# Patient Record
Sex: MALE | Race: WHITE | Employment: OTHER | URBAN - METROPOLITAN AREA
[De-identification: names, ages, dates, MRNs, and addresses within clinical notes are randomized per-mention and may not be internally consistent; named-entity substitution may affect disease eponyms.]

---

## 2018-02-06 PROBLEM — Z12.5 SCREENING PSA (PROSTATE SPECIFIC ANTIGEN): Status: ACTIVE | Noted: 2018-02-06

## 2018-02-06 PROBLEM — E66.3 OVERWEIGHT (BMI 25.0-29.9): Status: ACTIVE | Noted: 2018-02-06

## 2018-02-06 PROBLEM — I10 BENIGN ESSENTIAL HTN: Status: ACTIVE | Noted: 2018-02-06

## 2018-05-15 PROBLEM — E78.00 HYPERCHOLESTEROLEMIA: Status: ACTIVE | Noted: 2018-05-15

## 2019-02-05 PROBLEM — Z91.14 NONCOMPLIANCE WITH MEDICATION REGIMEN: Status: ACTIVE | Noted: 2019-02-05

## 2019-02-05 PROBLEM — Z91.89 CANDIDATE FOR STATIN THERAPY DUE TO RISK OF FUTURE CARDIOVASCULAR EVENT: Status: ACTIVE | Noted: 2019-02-05

## 2019-04-16 PROBLEM — E66.3 OVERWEIGHT (BMI 25.0-29.9): Status: RESOLVED | Noted: 2018-02-06 | Resolved: 2019-04-16

## 2019-04-16 PROBLEM — Z91.14 NONCOMPLIANCE WITH MEDICATION REGIMEN: Status: RESOLVED | Noted: 2019-02-05 | Resolved: 2019-04-16

## 2019-04-21 PROBLEM — G89.29 CHRONIC LEFT SHOULDER PAIN: Status: ACTIVE | Noted: 2019-04-21

## 2019-04-21 PROBLEM — M25.512 CHRONIC LEFT SHOULDER PAIN: Status: ACTIVE | Noted: 2019-04-21

## 2019-05-09 ENCOUNTER — HOSPITAL ENCOUNTER (OUTPATIENT)
Dept: CT IMAGING | Age: 59
Discharge: HOME | End: 2019-05-09
Attending: FAMILY MEDICINE

## 2019-05-09 DIAGNOSIS — I10 BENIGN ESSENTIAL HTN: ICD-10-CM

## 2019-05-09 DIAGNOSIS — E78.00 HYPERCHOLESTEROLEMIA: ICD-10-CM

## 2019-05-09 DIAGNOSIS — Z91.89 CANDIDATE FOR STATIN THERAPY DUE TO RISK OF FUTURE CARDIOVASCULAR EVENT: ICD-10-CM

## 2019-05-10 PROBLEM — R93.1 ELEVATED CORONARY ARTERY CALCIUM SCORE: Status: ACTIVE | Noted: 2019-05-10

## 2019-05-10 PROBLEM — I25.84 CORONARY ARTERY DISEASE DUE TO CALCIFIED CORONARY LESION: Status: ACTIVE | Noted: 2019-05-10

## 2019-05-10 PROBLEM — I25.10 CORONARY ARTERY DISEASE DUE TO CALCIFIED CORONARY LESION: Status: ACTIVE | Noted: 2019-05-10

## 2019-05-19 PROBLEM — Z12.5 SCREENING PSA (PROSTATE SPECIFIC ANTIGEN): Status: ACTIVE | Noted: 2019-05-19

## 2019-05-20 PROBLEM — I77.810 ASCENDING AORTA DILATION (HCC): Status: ACTIVE | Noted: 2019-05-20

## 2019-09-13 PROBLEM — I25.10 CORONARY ARTERY DISEASE DUE TO CALCIFIED CORONARY LESION: Status: ACTIVE | Noted: 2019-09-13

## 2019-09-13 PROBLEM — I25.84 CORONARY ARTERY DISEASE DUE TO CALCIFIED CORONARY LESION: Status: ACTIVE | Noted: 2019-09-13

## 2019-09-19 PROBLEM — Z12.5 SCREENING PSA (PROSTATE SPECIFIC ANTIGEN): Status: RESOLVED | Noted: 2019-05-19 | Resolved: 2019-09-19

## 2019-09-27 ENCOUNTER — HOSPITAL ENCOUNTER (OUTPATIENT)
Dept: ULTRASOUND IMAGING | Age: 59
Discharge: HOME | End: 2019-09-27
Attending: FAMILY MEDICINE
Payer: COMMERCIAL

## 2019-09-27 DIAGNOSIS — I77.810 ASCENDING AORTA DILATION (HCC): ICD-10-CM

## 2019-09-27 PROCEDURE — 93978 VASCULAR STUDY: CPT

## 2020-12-28 PROBLEM — Z79.899 HIGH RISK MEDICATION USE: Chronic | Status: ACTIVE | Noted: 2020-12-28

## 2020-12-28 PROBLEM — Z00.00 WELL ADULT EXAM: Chronic | Status: ACTIVE | Noted: 2020-12-28

## 2020-12-28 PROBLEM — Z12.5 SCREENING FOR PROSTATE CANCER: Chronic | Status: ACTIVE | Noted: 2020-12-28

## 2020-12-28 PROBLEM — Z13.1 SCREENING FOR DIABETES MELLITUS: Chronic | Status: ACTIVE | Noted: 2020-12-28

## 2020-12-28 PROBLEM — Z00.00 WELL ADULT EXAM: Status: ACTIVE | Noted: 2020-12-28

## 2020-12-28 PROBLEM — Z79.899 HIGH RISK MEDICATION USE: Status: ACTIVE | Noted: 2020-12-28

## 2020-12-28 PROBLEM — Z13.1 SCREENING FOR DIABETES MELLITUS: Status: ACTIVE | Noted: 2020-12-28

## 2020-12-28 PROBLEM — Z79.899 HIGH RISK MEDICATION USE: Status: RESOLVED | Noted: 2020-12-28 | Resolved: 2020-12-28

## 2020-12-28 PROBLEM — Z12.5 SCREENING FOR PROSTATE CANCER: Status: ACTIVE | Noted: 2020-12-28

## 2020-12-28 PROBLEM — Z28.21 REFUSED INFLUENZA VACCINE: Status: ACTIVE | Noted: 2020-12-28

## 2020-12-29 PROBLEM — G89.29 CHRONIC PAIN OF LEFT KNEE: Status: ACTIVE | Noted: 2020-12-29

## 2020-12-29 PROBLEM — M25.562 CHRONIC PAIN OF LEFT KNEE: Status: ACTIVE | Noted: 2020-12-29

## 2022-02-14 PROBLEM — Z28.21 COVID-19 VACCINATION REFUSED: Status: ACTIVE | Noted: 2022-02-14

## 2022-02-14 PROBLEM — Z28.21 REFUSED VARICELLA VACCINE: Status: ACTIVE | Noted: 2022-02-14

## 2022-03-18 PROBLEM — M25.512 CHRONIC LEFT SHOULDER PAIN: Status: ACTIVE | Noted: 2019-04-21

## 2022-03-18 PROBLEM — Z28.21 COVID-19 VACCINATION REFUSED: Status: ACTIVE | Noted: 2022-02-14

## 2022-03-18 PROBLEM — Z28.21 REFUSED VARICELLA VACCINE: Status: ACTIVE | Noted: 2022-02-14

## 2022-03-18 PROBLEM — G89.29 CHRONIC LEFT SHOULDER PAIN: Status: ACTIVE | Noted: 2019-04-21

## 2022-03-18 PROBLEM — Z28.21 REFUSED INFLUENZA VACCINE: Status: ACTIVE | Noted: 2020-12-28

## 2022-03-18 PROBLEM — I77.810 ASCENDING AORTA DILATION (HCC): Status: ACTIVE | Noted: 2019-05-20

## 2022-03-19 PROBLEM — Z00.00 WELL ADULT EXAM: Status: ACTIVE | Noted: 2020-12-28

## 2022-03-19 PROBLEM — E78.00 HYPERCHOLESTEROLEMIA: Status: ACTIVE | Noted: 2018-05-15

## 2022-03-19 PROBLEM — I10 BENIGN ESSENTIAL HTN: Status: ACTIVE | Noted: 2018-02-06

## 2022-03-19 PROBLEM — Z12.5 SCREENING FOR PROSTATE CANCER: Status: ACTIVE | Noted: 2020-12-28

## 2022-03-19 PROBLEM — Z79.899 HIGH RISK MEDICATION USE: Status: ACTIVE | Noted: 2020-12-28

## 2022-03-19 PROBLEM — R93.1 ELEVATED CORONARY ARTERY CALCIUM SCORE: Status: ACTIVE | Noted: 2019-05-10

## 2022-03-19 PROBLEM — Z13.1 SCREENING FOR DIABETES MELLITUS: Status: ACTIVE | Noted: 2020-12-28

## 2022-03-20 PROBLEM — I25.10 CORONARY ARTERY DISEASE DUE TO CALCIFIED CORONARY LESION: Status: ACTIVE | Noted: 2019-09-13

## 2022-03-20 PROBLEM — G89.29 CHRONIC PAIN OF LEFT KNEE: Status: ACTIVE | Noted: 2020-12-29

## 2022-03-20 PROBLEM — I25.84 CORONARY ARTERY DISEASE DUE TO CALCIFIED CORONARY LESION: Status: ACTIVE | Noted: 2019-09-13

## 2022-03-20 PROBLEM — M25.562 CHRONIC PAIN OF LEFT KNEE: Status: ACTIVE | Noted: 2020-12-29

## 2023-02-05 PROBLEM — Z12.5 SCREENING FOR PROSTATE CANCER: Chronic | Status: ACTIVE | Noted: 2020-12-28

## 2023-02-05 PROBLEM — I77.810 ASCENDING AORTA DILATION (HCC): Chronic | Status: ACTIVE | Noted: 2019-05-20

## 2023-02-05 PROBLEM — R93.1 ELEVATED CORONARY ARTERY CALCIUM SCORE: Status: ACTIVE | Noted: 2019-05-10

## 2023-02-05 PROBLEM — E78.00 HYPERCHOLESTEROLEMIA: Chronic | Status: ACTIVE | Noted: 2018-05-15

## 2023-02-05 PROBLEM — N17.9 AKI (ACUTE KIDNEY INJURY) (HCC): Status: ACTIVE | Noted: 2023-02-01

## 2023-02-05 PROBLEM — I25.10 CORONARY ARTERY DISEASE DUE TO CALCIFIED CORONARY LESION: Chronic | Status: ACTIVE | Noted: 2019-09-13

## 2023-02-05 PROBLEM — Z79.899 HIGH RISK MEDICATION USE: Chronic | Status: ACTIVE | Noted: 2020-12-28

## 2023-02-05 PROBLEM — R73.02 IGT (IMPAIRED GLUCOSE TOLERANCE): Chronic | Status: ACTIVE | Noted: 2023-02-05

## 2023-02-05 PROBLEM — I10 BENIGN ESSENTIAL HTN: Chronic | Status: ACTIVE | Noted: 2018-02-06

## 2023-02-05 PROBLEM — N17.9 AKI (ACUTE KIDNEY INJURY) (HCC): Status: RESOLVED | Noted: 2023-02-01 | Resolved: 2023-02-05

## 2023-02-05 PROBLEM — R55 SYNCOPE: Status: ACTIVE | Noted: 2023-02-01

## 2023-02-05 PROBLEM — S27.0XXA TRAUMATIC PNEUMOTHORAX: Status: ACTIVE | Noted: 2023-02-01

## 2023-02-05 PROBLEM — S22.31XA CLOSED FRACTURE OF ONE RIB OF RIGHT SIDE: Status: ACTIVE | Noted: 2023-02-01

## 2023-02-05 PROBLEM — R93.1 ELEVATED CORONARY ARTERY CALCIUM SCORE: Chronic | Status: ACTIVE | Noted: 2019-05-10

## 2023-02-05 PROBLEM — I25.84 CORONARY ARTERY DISEASE DUE TO CALCIFIED CORONARY LESION: Chronic | Status: ACTIVE | Noted: 2019-09-13

## 2023-02-05 PROBLEM — Z13.1 SCREENING FOR DIABETES MELLITUS: Chronic | Status: ACTIVE | Noted: 2020-12-28

## 2023-02-05 PROBLEM — R73.02 IGT (IMPAIRED GLUCOSE TOLERANCE): Status: ACTIVE | Noted: 2023-02-05

## 2023-02-05 PROBLEM — Z00.00 WELL ADULT EXAM: Chronic | Status: ACTIVE | Noted: 2020-12-28

## 2023-02-05 RX ORDER — METOPROLOL SUCCINATE 25 MG/1
TABLET, EXTENDED RELEASE ORAL
COMMUNITY
Start: 2023-02-03 | End: 2023-02-06 | Stop reason: ALTCHOICE

## 2023-02-05 RX ORDER — ONDANSETRON 4 MG/1
TABLET, ORALLY DISINTEGRATING ORAL
COMMUNITY
Start: 2023-02-03 | End: 2023-02-06 | Stop reason: ALTCHOICE

## 2023-02-05 RX ORDER — LISINOPRIL 20 MG/1
TABLET ORAL
COMMUNITY
Start: 2023-02-03

## 2023-02-05 RX ORDER — ACETAMINOPHEN 325 MG/1
650 TABLET ORAL
COMMUNITY
Start: 2023-02-03 | End: 2023-03-05

## 2023-02-05 RX ORDER — GABAPENTIN 100 MG/1
100-200 CAPSULE ORAL EVERY 8 HOURS
COMMUNITY
Start: 2023-02-03 | End: 2023-02-10

## 2023-02-05 RX ORDER — OXYCODONE HYDROCHLORIDE 5 MG/1
5 TABLET ORAL EVERY 6 HOURS PRN
COMMUNITY
Start: 2023-02-03 | End: 2023-02-06 | Stop reason: CLARIF

## 2023-02-05 RX ORDER — METHOCARBAMOL 750 MG/1
TABLET, FILM COATED ORAL
COMMUNITY
Start: 2023-02-03

## 2023-02-05 ASSESSMENT — ENCOUNTER SYMPTOMS
CONSTIPATION: 0
WHEEZING: 0
CHEST TIGHTNESS: 0
NAUSEA: 0
COUGH: 0
VOMITING: 0
ALLERGIC/IMMUNOLOGIC NEGATIVE: 1
BACK PAIN: 0
DIARRHEA: 0
EYES NEGATIVE: 1
SHORTNESS OF BREATH: 0
ABDOMINAL PAIN: 0

## 2023-02-05 NOTE — PATIENT INSTRUCTIONS
^^^^^    Get Tetanus (Tdap) Shot at [address]    The vaccine is less expensive to get at a pharmacy than in our office. It is usually not covered by insurance and runs about $48 with [employer] discount (not including any administration fees). No written prescription is necessary. Please ask the pharmacist to send us documentation of your immunization OR send us a localbacon message telling us that you got the booster shot and we will update your immunization record. This shot is generally given every 10 years unless you have a serious, contaminated injury (often a laceration or abrasion) after 5 years. The vaccine confers immunity to the tetanus germ which infects wounds, and against bacteria causing diptheria and pertussis (whooping cough).

## 2023-02-05 NOTE — PROGRESS NOTES
Dereck Soares DO                Diplomate of the [employer] of [facility]         [phone]    Lavanda Severin is a 58 y.o. male who was seen on 2/6/2023 for   Chief Complaint   Patient presents with    Annual Exam    Follow-up     2/1/2023         Assessment & Plan     Diagnosis Orders   1. Well adult exam  Hepatic Function Panel    PSA Screening    Hemoglobin A1C    Comprehensive Metabolic Panel    Lipid Panel    PSA Screening    Hemoglobin A1C    2/6/23      2. Benign essential HTN  Comprehensive Metabolic Panel    Well-controlled on slightly higher dose of lisinopril. Discontinue metoprolol, especially with recent syncopal event. Will increase lisinopril if needed      3. Hypercholesterolemia  Hepatic Function Panel    Comprehensive Metabolic Panel    Lipid Panel    rosuvastatin (CRESTOR) 20 MG tablet    Recheck lipids today      4. Coronary artery disease due to calcified coronary lesion  Lipid Panel    rosuvastatin (CRESTOR) 20 MG tablet    Asymptomatic      5. Syncope, unspecified syncope type      Single episode 2/1/23      6. Fall from standing, subsequent encounter      DOI 2/1/23 due to syncope      7. Closed fracture of one rib of right side with routine healing, subsequent encounter      DOI 2/1/23      8. Traumatic pneumothorax, subsequent encounter      DOI 2/1/23      9. POLO (acute kidney injury) ([facility] [address]      RESOLVED      10. Ascending aorta dilation (HCC)      MILD. Will contact Dr. Libby Mosquera about follow up      11. Dilated aortic root (HCC)      MILD. Will contact Dr. Libby Mosquera bout follow up      12. IGT (impaired glucose tolerance)  Hemoglobin A1C    Comprehensive Metabolic Panel    Hemoglobin A1C    Check hemoglobin A1c.      13. Screening for diabetes mellitus  Comprehensive Metabolic Panel      14. Screening for prostate cancer  PSA Screening    PSA Screening      15.  High risk medication use Hepatic Function Panel    Comprehensive Metabolic Panel      16. Refused influenza vaccine        17. Need for Tdap vaccination      Advise getting this at local pharmacy.        Follow-up and Dispositions    Return in about 1 year (around 2/7/2024) for CPE CARLOS, LABS TODAY.         RECENT LABS/TESTS TO REVIEW and DISCUSS    No results found for this visit on 02/06/23.    PHQ-9  2/6/2023 2/1/2022 4/16/2019   Little interest or pleasure in doing things 0 - 0   Little interest or pleasure in doing things - 0 -   Feeling down, depressed, or hopeless 0 - 0   PHQ-2 Score 0 - 0   Total Score PHQ 2 - 0 -   PHQ-9 Total Score 0 - 0     Recent Hospital Admission    Summary  Levon Glez is a 62 y.o. male admitted for   Patient Active Problem List   Diagnosis   Syncope   Hypertension   Hyperlipidemia   Closed fracture of one rib of right side   Traumatic pneumothorax   POLO (acute kidney injury) (HCC)     Pt will be discharging to Home or Self Care. Orders and prescriptions sent with patient. Patient verbalized understanding and is in agreement with discharge plan.     Respiratory failure usual state of health. Yesterday he had a normal morning usually gets up around 5 AM to leave for work. He works in a  center. He had his usual coffee on the way to work and felt fine got to  and started to feel little bit queasy and \"off\". He has a hard time describing the \"off\" feeling but says he was not really dizzy. Stomach was a little bit upset so he went to go use the restroom and had a watery bowel movement. He has not had other diarrhea nausea or vomiting. After he went to use the restroom he walked around the  center continued to feel poorly so he decided to go outside to get some fresh air. Once he left the door he fainted abruptly. He came to several minutes later with bystanders helping him. He did lose his bowel but no bladder incontinence. There was no postictal lethargy or confusion.     No other history  of syncopal episodes. No history of sudden cardiac death but does have a family history of grandparents, aunts uncles with coronary artery disease. No recent medication changes been on lisinopril for blood pressure. Subjective    HPI:     This is a 26-year-old male patient who is here today for annual follow-up on several medical conditions as well as a complete physical examination. The patient did not have labs in advance of today's visit. Instead we will draw labs today or at a future date. The patient had a recent hospitalization and was discharged several days ago. He had a syncopal episode after having diarrhea and abdominal cramping. In the fall, he apparently broke a rib. On admission to the hospital he had an acute kidney injury and pneumothorax. Blood pressure was elevated (likely because of pain) and his medications were adjusted. For now continue with lisinopril 20 mg and discontinue metoprolol. Echocardiogram done during his hospitalization showed mildly dilated aortic root and ascending aorta. Will communicate with Dr. Rocco Lorenzo to find out if he needs any follow-up on this. Reviewed and updated this visit by provider:           Review of Systems   Constitutional:  Negative for fatigue and fever. HENT: Negative. Eyes: Negative. Respiratory:  Negative for cough, chest tightness, shortness of breath and wheezing. Cardiovascular:  Negative for chest pain and leg swelling. Gastrointestinal:  Negative for abdominal pain, constipation, diarrhea, nausea and vomiting. Endocrine: Negative. Genitourinary:  Negative for difficulty urinating, dysuria and frequency. Musculoskeletal:  Negative for arthralgias, back pain and neck pain. Skin:  Negative for rash. Allergic/Immunologic: Negative. Neurological:  Negative for dizziness and light-headedness. Psychiatric/Behavioral:  Negative for dysphoric mood. The patient is not nervous/anxious.     All other systems reviewed and are negative. Objective    /68 (Site: Left Upper Arm, Position: Sitting, Cuff Size: Large Adult)   Pulse 64   Ht 5' 11\" (1.803 m)   Wt 181 lb (82.1 kg)   SpO2 97%   BMI 25.24 kg/m²     Physical Exam  Vitals reviewed. Constitutional:       General: He is not in acute distress. Appearance: Normal appearance. He is well-developed and well-groomed. HENT:      Head: Normocephalic and atraumatic. Right Ear: Tympanic membrane, ear canal and external ear normal.      Left Ear: Tympanic membrane, ear canal and external ear normal.      Mouth/Throat:      Mouth: Mucous membranes are moist.   Eyes:      General: Lids are normal.      Extraocular Movements: Extraocular movements intact. Conjunctiva/sclera: Conjunctivae normal.      Pupils: Pupils are equal, round, and reactive to light. Neck:      Vascular: No carotid bruit. Cardiovascular:      Rate and Rhythm: Normal rate and regular rhythm. Heart sounds: Normal heart sounds. Pulmonary:      Effort: Pulmonary effort is normal.      Breath sounds: Normal breath sounds. Abdominal:      General: Bowel sounds are normal.      Palpations: Abdomen is soft. There is no hepatomegaly, splenomegaly or mass. Tenderness: There is no abdominal tenderness. Hernia: No hernia is present. There is no hernia in the left inguinal area or right inguinal area. Genitourinary:     Penis: Normal.       Testes: Normal.      Epididymis:      Right: Normal.      Left: Normal.   Musculoskeletal:      Cervical back: Neck supple. No rigidity or tenderness. Lymphadenopathy:      Cervical: No cervical adenopathy. Skin:     General: Skin is warm and dry. Neurological:      General: No focal deficit present. Mental Status: He is alert and oriented to person, place, and time. Cranial Nerves: Cranial nerves 2-12 are intact. Sensory: Sensation is intact. Motor: Motor function is intact.       Deep Tendon Reflexes: Reflexes are normal and symmetric. Reflex Scores:       Brachioradialis reflexes are 2+ on the right side and 2+ on the left side. Patellar reflexes are 2+ on the right side and 2+ on the left side. Psychiatric:         Attention and Perception: Attention and perception normal.         Mood and Affect: Mood and affect normal.         Speech: Speech normal.         Behavior: Behavior normal. Behavior is cooperative. Thought Content: Thought content normal.         Cognition and Memory: Cognition and memory normal.         Judgment: Judgment normal.       On this date 02/06/2023 I have spent 30 minutes reviewing previous notes, lab/imaging results and face to face with the patient discussing the diagnoses and importance of compliance with the treatment plan, as well as documenting on the day of the visit.         Aayush Pederson DO  [facility]

## 2023-02-06 ENCOUNTER — OFFICE VISIT (OUTPATIENT)
Dept: FAMILY MEDICINE CLINIC | Facility: CLINIC | Age: 63
End: 2023-02-06
Payer: COMMERCIAL

## 2023-02-06 VITALS
OXYGEN SATURATION: 97 % | BODY MASS INDEX: 25.34 KG/M2 | DIASTOLIC BLOOD PRESSURE: 68 MMHG | HEIGHT: 71 IN | WEIGHT: 181 LBS | SYSTOLIC BLOOD PRESSURE: 122 MMHG | HEART RATE: 64 BPM

## 2023-02-06 DIAGNOSIS — R55 SYNCOPE, UNSPECIFIED SYNCOPE TYPE: ICD-10-CM

## 2023-02-06 DIAGNOSIS — I25.84 CORONARY ARTERY DISEASE DUE TO CALCIFIED CORONARY LESION: Chronic | ICD-10-CM

## 2023-02-06 DIAGNOSIS — Z23 NEED FOR TDAP VACCINATION: ICD-10-CM

## 2023-02-06 DIAGNOSIS — N17.9 AKI (ACUTE KIDNEY INJURY) (HCC): ICD-10-CM

## 2023-02-06 DIAGNOSIS — Z13.1 SCREENING FOR DIABETES MELLITUS: Chronic | ICD-10-CM
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
## 2023-02-06 DIAGNOSIS — I25.10 CORONARY ARTERY DISEASE DUE TO CALCIFIED CORONARY LESION: Chronic | ICD-10-CM

## 2023-02-06 DIAGNOSIS — R73.02 IGT (IMPAIRED GLUCOSE TOLERANCE): ICD-10-CM

## 2023-02-06 DIAGNOSIS — Z00.00 WELL ADULT EXAM: Chronic | ICD-10-CM

## 2023-02-06 DIAGNOSIS — Z79.899 HIGH RISK MEDICATION USE: Chronic | ICD-10-CM

## 2023-02-06 DIAGNOSIS — I10 BENIGN ESSENTIAL HTN: Chronic | ICD-10-CM

## 2023-02-06 DIAGNOSIS — Z00.00 WELL ADULT EXAM: Primary | Chronic | ICD-10-CM

## 2023-02-06 DIAGNOSIS — I77.810 ASCENDING AORTA DILATION (HCC): Chronic | ICD-10-CM

## 2023-02-06 DIAGNOSIS — S27.0XXD TRAUMATIC PNEUMOTHORAX, SUBSEQUENT ENCOUNTER: ICD-10-CM

## 2023-02-06 DIAGNOSIS — S22.31XD CLOSED FRACTURE OF ONE RIB OF RIGHT SIDE WITH ROUTINE HEALING, SUBSEQUENT ENCOUNTER: ICD-10-CM

## 2023-02-06 DIAGNOSIS — Z12.5 SCREENING FOR PROSTATE CANCER: Chronic | ICD-10-CM

## 2023-02-06 DIAGNOSIS — E78.00 HYPERCHOLESTEROLEMIA: Chronic | ICD-10-CM

## 2023-02-06 DIAGNOSIS — Z28.21 REFUSED INFLUENZA VACCINE: ICD-10-CM

## 2023-02-06 DIAGNOSIS — I77.810 DILATED AORTIC ROOT (HCC): ICD-10-CM

## 2023-02-06 LAB
ALBUMIN SERPL-MCNC: 3.9 G/DL (ref 3.2–4.6)
ALBUMIN/GLOB SERPL: 1.1 (ref 0.4–1.6)
ALP SERPL-CCNC: 47 U/L (ref 50–136)
ALT SERPL-CCNC: 25 U/L (ref 12–65)
AST SERPL-CCNC: 14 U/L (ref 15–37)
BILIRUB DIRECT SERPL-MCNC: 0.2 MG/DL
BILIRUB SERPL-MCNC: 0.8 MG/DL (ref 0.2–1.1)
GLOBULIN SER CALC-MCNC: 3.5 G/DL (ref 2.8–4.5)
PROT SERPL-MCNC: 7.4 G/DL (ref 6.3–8.2)
PSA SERPL-MCNC: 0.6 NG/ML

## 2023-02-06 PROCEDURE — 3074F SYST BP LT 130 MM HG: CPT | Performed by: FAMILY MEDICINE

## 2023-02-06 PROCEDURE — 99213 OFFICE O/P EST LOW 20 MIN: CPT | Performed by: FAMILY MEDICINE

## 2023-02-06 PROCEDURE — 3078F DIAST BP <80 MM HG: CPT | Performed by: FAMILY MEDICINE

## 2023-02-06 PROCEDURE — 99396 PREV VISIT EST AGE 40-64: CPT | Performed by: FAMILY MEDICINE

## 2023-02-06 RX ORDER — ROSUVASTATIN CALCIUM 20 MG/1
20 TABLET, COATED ORAL EVERY EVENING
Qty: 90 TABLET | Refills: 3 | Status: SHIPPED | OUTPATIENT
Start: 2023-02-06

## 2023-02-06 SDOH — ECONOMIC STABILITY: INCOME INSECURITY: HOW HARD IS IT FOR YOU TO PAY FOR THE VERY BASICS LIKE FOOD, HOUSING, MEDICAL CARE, AND HEATING?: PATIENT DECLINED

## 2023-02-06 SDOH — ECONOMIC STABILITY: FOOD INSECURITY: WITHIN THE PAST 12 MONTHS, YOU WORRIED THAT YOUR FOOD WOULD RUN OUT BEFORE YOU GOT MONEY TO BUY MORE.: PATIENT DECLINED

## 2023-02-06 SDOH — ECONOMIC STABILITY: HOUSING INSECURITY
IN THE LAST 12 MONTHS, WAS THERE A TIME WHEN YOU DID NOT HAVE A STEADY PLACE TO SLEEP OR SLEPT IN A SHELTER (INCLUDING NOW)?: PATIENT REFUSED

## 2023-02-06 SDOH — ECONOMIC STABILITY: FOOD INSECURITY: WITHIN THE PAST 12 MONTHS, THE FOOD YOU BOUGHT JUST DIDN'T LAST AND YOU DIDN'T HAVE MONEY TO GET MORE.: PATIENT DECLINED

## 2023-02-06 ASSESSMENT — PATIENT HEALTH QUESTIONNAIRE - PHQ9
SUM OF ALL RESPONSES TO PHQ QUESTIONS 1-9: 0
1. LITTLE INTEREST OR PLEASURE IN DOING THINGS: 0
SUM OF ALL RESPONSES TO PHQ9 QUESTIONS 1 & 2: 0
2. FEELING DOWN, DEPRESSED OR HOPELESS: 0
SUM OF ALL RESPONSES TO PHQ QUESTIONS 1-9: 0

## 2023-02-06 NOTE — Clinical Note
Celina patient who you consulted on in the past. Had a syncopal episode that I THINK sounded vasovagal (low sugar, abdominal cramping). Had a cardiac workup in [place] (he broke a rib and got a pneumothorax). Anyway, aortic root and ascending aorta are slightly dilated. Do you have a follow up suggestion on that? Need to see him?

## 2023-02-07 LAB
EST. AVERAGE GLUCOSE BLD GHB EST-MCNC: 111 MG/DL
HBA1C MFR BLD: 5.5 % (ref 4.8–5.6)

## 2023-03-07 PROBLEM — Z13.1 SCREENING FOR DIABETES MELLITUS: Chronic | Status: RESOLVED | Noted: 2020-12-28 | Resolved: 2023-03-07

## 2023-03-07 PROBLEM — Z12.5 SCREENING FOR PROSTATE CANCER: Chronic | Status: RESOLVED | Noted: 2020-12-28 | Resolved: 2023-03-07

## 2023-03-07 PROBLEM — Z00.00 WELL ADULT EXAM: Chronic | Status: RESOLVED | Noted: 2020-12-28 | Resolved: 2023-03-07

## 2023-04-20 DIAGNOSIS — I25.84 CORONARY ARTERY DISEASE DUE TO CALCIFIED CORONARY LESION: Chronic | ICD-10-CM

## 2023-04-20 DIAGNOSIS — I25.10 CORONARY ARTERY DISEASE DUE TO CALCIFIED CORONARY LESION: Chronic | ICD-10-CM

## 2023-04-20 DIAGNOSIS — E78.00 HYPERCHOLESTEROLEMIA: Chronic | ICD-10-CM

## 2023-04-24 RX ORDER — ROSUVASTATIN CALCIUM 20 MG/1
20 TABLET, COATED ORAL EVERY EVENING
Qty: 90 TABLET | Refills: 3 | OUTPATIENT
Start: 2023-04-24

## 2023-04-24 RX ORDER — LISINOPRIL 20 MG/1
20 TABLET ORAL DAILY
Qty: 30 TABLET | Refills: 11 | Status: SHIPPED | OUTPATIENT
Start: 2023-04-24

## 2023-04-27 ENCOUNTER — OFFICE VISIT (OUTPATIENT)
Dept: CARDIOLOGY CLINIC | Age: 63
End: 2023-04-27
Payer: COMMERCIAL

## 2023-04-27 VITALS
HEART RATE: 58 BPM | SYSTOLIC BLOOD PRESSURE: 140 MMHG | WEIGHT: 185 LBS | HEIGHT: 71 IN | BODY MASS INDEX: 25.9 KG/M2 | DIASTOLIC BLOOD PRESSURE: 90 MMHG

## 2023-04-27 DIAGNOSIS — I25.10 CORONARY ARTERY DISEASE DUE TO CALCIFIED CORONARY LESION: Chronic | ICD-10-CM

## 2023-04-27 DIAGNOSIS — R93.1 ELEVATED CORONARY ARTERY CALCIUM SCORE: ICD-10-CM

## 2023-04-27 DIAGNOSIS — E78.00 HYPERCHOLESTEROLEMIA: Chronic | ICD-10-CM

## 2023-04-27 DIAGNOSIS — I77.810 DILATED AORTIC ROOT (HCC): ICD-10-CM

## 2023-04-27 DIAGNOSIS — I25.84 CORONARY ARTERY DISEASE DUE TO CALCIFIED CORONARY LESION: Chronic | ICD-10-CM

## 2023-04-27 DIAGNOSIS — R55 SYNCOPE, UNSPECIFIED SYNCOPE TYPE: ICD-10-CM

## 2023-04-27 DIAGNOSIS — I10 BENIGN ESSENTIAL HTN: Primary | Chronic | ICD-10-CM

## 2023-04-27 DIAGNOSIS — I77.810 ASCENDING AORTA DILATION (HCC): Chronic | ICD-10-CM

## 2023-04-27 PROCEDURE — 3080F DIAST BP >= 90 MM HG: CPT | Performed by: INTERNAL MEDICINE

## 2023-04-27 PROCEDURE — 3077F SYST BP >= 140 MM HG: CPT | Performed by: INTERNAL MEDICINE

## 2023-04-27 PROCEDURE — 93000 ELECTROCARDIOGRAM COMPLETE: CPT | Performed by: INTERNAL MEDICINE

## 2023-04-27 PROCEDURE — 99203 OFFICE O/P NEW LOW 30 MIN: CPT | Performed by: INTERNAL MEDICINE

## 2023-04-27 NOTE — PROGRESS NOTES
[address], [address]  PHONE: [phone]        23    NAME:  Joanna Bowen  : 1960  MRN: [id number]       SUBJECTIVE:   Joanna oBwen is a 58 y.o. male seen for a consultation visit regarding the following:     Chief Complaint   Patient presents with    New Patient     Last Seen     Hypertension          HPI:  Consultation is requested by Christina Kunz DO for evaluation of New Patient (Last Seen ) and Hypertension  Ca score 2019: CORONARY ARTERY CALCIUM SCORE . This is predominantly in the proximal left system. The aorta dilated, 4.2 cm  Echo 2019: normal EF, mild AI  Echo 2023: normal EF, AO 4.2cm     Was up early one morning at school, had diarrhea and did not feel well. The fainted while standing up. Had rib fx and was hosp at [facility], 2023. He has been struggling with some MATHUR, SOB. No CP . NO pressure. More water intake now. Patient denies recent history of orthopnea, PND, excessive dizziness and/or syncope. 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        GF  from [place] in 45s. Uncle  from [place]   Mother with CAD         Past Medical History, Past Surgical History, Family history, Social History, and Medications were all reviewed with the patient today and updated as necessary. Current Outpatient Medications   Medication Sig Dispense Refill    lisinopril (PRINIVIL;ZESTRIL) 20 MG tablet Take 1 tablet by mouth daily 30 tablet 11    rosuvastatin (CRESTOR) 20 MG tablet Take 1 tablet by mouth every evening 90 tablet 3    aspirin 81 MG EC tablet Take 1 tablet by mouth daily       No current facility-administered medications for this visit. No Known Allergies  Patient Active Problem List    Diagnosis Date Noted    Dilated aortic root ([facility] [address] 2023     Priority: Medium     MILD.  Will contact Dr. Ted smith follow up      IGT (impaired